# Patient Record
Sex: MALE | Employment: OTHER | URBAN - METROPOLITAN AREA
[De-identification: names, ages, dates, MRNs, and addresses within clinical notes are randomized per-mention and may not be internally consistent; named-entity substitution may affect disease eponyms.]

---

## 2020-04-06 ENCOUNTER — HOSPITAL ENCOUNTER (OUTPATIENT)
Age: 75
Setting detail: SPECIMEN
Discharge: HOME | End: 2020-04-06
Payer: MEDICARE

## 2020-04-06 LAB
ALBUMIN SERPL-MCNC: 3.6 G/DL (ref 3.5–5.2)
ALBUMIN/GLOBULIN RATIO: 1.1 (ref 1–2.5)
ALP BLD-CCNC: 149 U/L (ref 40–129)
ALT SERPL-CCNC: 40 U/L (ref 5–41)
AST SERPL-CCNC: 22 U/L
BILIRUB SERPL-MCNC: 0.51 MG/DL (ref 0.3–1.2)
BILIRUBIN DIRECT: 0.22 MG/DL
BILIRUBIN, INDIRECT: 0.29 MG/DL (ref 0–1)
CREAT SERPL-MCNC: 0.65 MG/DL (ref 0.7–1.2)
GFR AFRICAN AMERICAN: >60 ML/MIN
GFR NON-AFRICAN AMERICAN: >60 ML/MIN
GFR SERPL CREATININE-BSD FRML MDRD: ABNORMAL ML/MIN/{1.73_M2}
GFR SERPL CREATININE-BSD FRML MDRD: ABNORMAL ML/MIN/{1.73_M2}
GLOBULIN: ABNORMAL G/DL (ref 1.5–3.8)
PLATELET # BLD: 296 K/UL (ref 140–450)
TOTAL PROTEIN: 6.9 G/DL (ref 6.4–8.3)
WBC # BLD: 5.2 K/UL (ref 3.5–11)

## 2020-04-06 PROCEDURE — 82565 ASSAY OF CREATININE: CPT

## 2020-04-06 PROCEDURE — 85049 AUTOMATED PLATELET COUNT: CPT

## 2020-04-06 PROCEDURE — 80076 HEPATIC FUNCTION PANEL: CPT

## 2020-04-06 PROCEDURE — 85048 AUTOMATED LEUKOCYTE COUNT: CPT

## 2020-04-06 PROCEDURE — 36415 COLL VENOUS BLD VENIPUNCTURE: CPT

## 2020-04-09 ENCOUNTER — TELEPHONE (OUTPATIENT)
Dept: INFECTIOUS DISEASES | Age: 75
End: 2020-04-09

## 2020-04-20 ENCOUNTER — HOSPITAL ENCOUNTER (OUTPATIENT)
Age: 75
Setting detail: SPECIMEN
Discharge: HOME | End: 2020-04-20
Payer: MEDICARE

## 2020-04-20 LAB
ALBUMIN SERPL-MCNC: 3.9 G/DL (ref 3.5–5.2)
ALBUMIN/GLOBULIN RATIO: 1.3 (ref 1–2.5)
ALP BLD-CCNC: 125 U/L (ref 40–129)
ALT SERPL-CCNC: 36 U/L (ref 5–41)
AST SERPL-CCNC: 30 U/L
BILIRUB SERPL-MCNC: 0.6 MG/DL (ref 0.3–1.2)
BILIRUBIN DIRECT: 0.18 MG/DL
BILIRUBIN, INDIRECT: 0.42 MG/DL (ref 0–1)
CREAT SERPL-MCNC: 0.58 MG/DL (ref 0.7–1.2)
GFR AFRICAN AMERICAN: >60 ML/MIN
GFR NON-AFRICAN AMERICAN: >60 ML/MIN
GFR SERPL CREATININE-BSD FRML MDRD: ABNORMAL ML/MIN/{1.73_M2}
GFR SERPL CREATININE-BSD FRML MDRD: ABNORMAL ML/MIN/{1.73_M2}
GLOBULIN: NORMAL G/DL (ref 1.5–3.8)
PLATELET # BLD: 156 K/UL (ref 138–453)
TOTAL PROTEIN: 6.9 G/DL (ref 6.4–8.3)
WBC # BLD: 4.7 K/UL (ref 3.5–11.3)

## 2020-04-20 PROCEDURE — 82565 ASSAY OF CREATININE: CPT

## 2020-04-20 PROCEDURE — 80076 HEPATIC FUNCTION PANEL: CPT

## 2020-04-20 PROCEDURE — 85048 AUTOMATED LEUKOCYTE COUNT: CPT

## 2020-04-20 PROCEDURE — 85049 AUTOMATED PLATELET COUNT: CPT

## 2020-04-22 ENCOUNTER — TELEPHONE (OUTPATIENT)
Dept: INFECTIOUS DISEASES | Age: 75
End: 2020-04-22

## 2020-04-22 ENCOUNTER — TELEMEDICINE (OUTPATIENT)
Dept: INFECTIOUS DISEASES | Age: 75
End: 2020-04-22
Payer: MEDICARE

## 2020-04-22 VITALS
TEMPERATURE: 98 F | SYSTOLIC BLOOD PRESSURE: 118 MMHG | DIASTOLIC BLOOD PRESSURE: 76 MMHG | HEIGHT: 66 IN | WEIGHT: 157 LBS | BODY MASS INDEX: 25.23 KG/M2

## 2020-04-22 PROCEDURE — G8427 DOCREV CUR MEDS BY ELIG CLIN: HCPCS | Performed by: INTERNAL MEDICINE

## 2020-04-22 PROCEDURE — 4040F PNEUMOC VAC/ADMIN/RCVD: CPT | Performed by: INTERNAL MEDICINE

## 2020-04-22 PROCEDURE — 3017F COLORECTAL CA SCREEN DOC REV: CPT | Performed by: INTERNAL MEDICINE

## 2020-04-22 PROCEDURE — 1123F ACP DISCUSS/DSCN MKR DOCD: CPT | Performed by: INTERNAL MEDICINE

## 2020-04-22 PROCEDURE — 99213 OFFICE O/P EST LOW 20 MIN: CPT | Performed by: INTERNAL MEDICINE

## 2020-04-22 RX ORDER — SIMVASTATIN 5 MG
5 TABLET ORAL NIGHTLY
COMMUNITY

## 2020-04-22 NOTE — PROGRESS NOTES
note was completed using a voice transcription system. Every effort was made to ensure accuracy. However, inadvertent computerized transcription errors may be present.

## 2020-04-27 ENCOUNTER — HOSPITAL ENCOUNTER (OUTPATIENT)
Age: 75
Setting detail: SPECIMEN
Discharge: HOME | End: 2020-04-27
Payer: MEDICARE

## 2020-04-27 LAB
ALBUMIN SERPL-MCNC: 3.9 G/DL (ref 3.5–5.2)
ALBUMIN/GLOBULIN RATIO: 1.4 (ref 1–2.5)
ALP BLD-CCNC: 111 U/L (ref 40–129)
ALT SERPL-CCNC: 41 U/L (ref 5–41)
AST SERPL-CCNC: 31 U/L
BILIRUB SERPL-MCNC: 1 MG/DL (ref 0.3–1.2)
BILIRUBIN DIRECT: 0.23 MG/DL
BILIRUBIN, INDIRECT: 0.77 MG/DL (ref 0–1)
CREAT SERPL-MCNC: 0.58 MG/DL (ref 0.7–1.2)
GFR AFRICAN AMERICAN: >60 ML/MIN
GFR NON-AFRICAN AMERICAN: >60 ML/MIN
GFR SERPL CREATININE-BSD FRML MDRD: ABNORMAL ML/MIN/{1.73_M2}
GFR SERPL CREATININE-BSD FRML MDRD: ABNORMAL ML/MIN/{1.73_M2}
GLOBULIN: NORMAL G/DL (ref 1.5–3.8)
PLATELET # BLD: 151 K/UL (ref 138–453)
TOTAL PROTEIN: 6.7 G/DL (ref 6.4–8.3)
WBC # BLD: 4.8 K/UL (ref 3.5–11.3)

## 2020-04-27 PROCEDURE — 85049 AUTOMATED PLATELET COUNT: CPT

## 2020-04-27 PROCEDURE — 85048 AUTOMATED LEUKOCYTE COUNT: CPT

## 2020-04-27 PROCEDURE — 82565 ASSAY OF CREATININE: CPT

## 2020-04-27 PROCEDURE — 80076 HEPATIC FUNCTION PANEL: CPT

## 2020-05-04 ENCOUNTER — HOSPITAL ENCOUNTER (OUTPATIENT)
Age: 75
Setting detail: SPECIMEN
Discharge: HOME | End: 2020-05-04
Payer: MEDICARE

## 2020-05-04 ENCOUNTER — TELEPHONE (OUTPATIENT)
Dept: INFECTIOUS DISEASES | Age: 75
End: 2020-05-04

## 2020-05-04 LAB
ALBUMIN SERPL-MCNC: 3.9 G/DL (ref 3.5–5.2)
ALBUMIN/GLOBULIN RATIO: 1.7 (ref 1–2.5)
ALP BLD-CCNC: 102 U/L (ref 40–129)
ALT SERPL-CCNC: 40 U/L (ref 5–41)
AST SERPL-CCNC: 28 U/L
BILIRUB SERPL-MCNC: 0.61 MG/DL (ref 0.3–1.2)
BILIRUBIN DIRECT: 0.16 MG/DL
BILIRUBIN, INDIRECT: 0.45 MG/DL (ref 0–1)
CREAT SERPL-MCNC: 0.61 MG/DL (ref 0.7–1.2)
GFR AFRICAN AMERICAN: >60 ML/MIN
GFR NON-AFRICAN AMERICAN: >60 ML/MIN
GFR SERPL CREATININE-BSD FRML MDRD: ABNORMAL ML/MIN/{1.73_M2}
GFR SERPL CREATININE-BSD FRML MDRD: ABNORMAL ML/MIN/{1.73_M2}
GLOBULIN: ABNORMAL G/DL (ref 1.5–3.8)
PLATELET # BLD: 133 K/UL (ref 138–453)
TOTAL PROTEIN: 6.2 G/DL (ref 6.4–8.3)
WBC # BLD: 4.1 K/UL (ref 3.5–11.3)

## 2020-05-04 PROCEDURE — 82565 ASSAY OF CREATININE: CPT

## 2020-05-04 PROCEDURE — 85049 AUTOMATED PLATELET COUNT: CPT

## 2020-05-04 PROCEDURE — 85048 AUTOMATED LEUKOCYTE COUNT: CPT

## 2020-05-04 PROCEDURE — 80076 HEPATIC FUNCTION PANEL: CPT

## 2020-05-04 NOTE — TELEPHONE ENCOUNTER
Carolyn Bailon MD 5/4/2020 10:40 AM Damir Ortiz [id number] called asking about IV ABX end date. Signed order to [facility] shows start date of 3-31 and to continue for 6 weeks which would be May 11th. Your last note says May 7th. When do you want it to end? Thanks, Raji Lopez 5/4/2020 11:22 AM       Dr Paula Michele called and said to use the date in his last note. I called [facility] and spoke to Allison Pichardo, Pharmacist and gave verbal order. Patient also advised.  [phone]

## 2020-05-13 DIAGNOSIS — L02.91 ABSCESS: Primary | ICD-10-CM

## 2020-05-14 ENCOUNTER — HOSPITAL ENCOUNTER (OUTPATIENT)
Age: 75
Setting detail: SPECIMEN
Discharge: HOME | End: 2020-05-14
Payer: MEDICARE

## 2020-05-14 LAB — PLATELET # BLD: 133 K/UL (ref 138–453)

## 2020-05-14 PROCEDURE — 85049 AUTOMATED PLATELET COUNT: CPT

## 2020-05-27 DIAGNOSIS — L02.91 ABSCESS: Primary | ICD-10-CM

## 2020-06-10 ENCOUNTER — TELEPHONE (OUTPATIENT)
Dept: INFECTIOUS DISEASES | Age: 75
End: 2020-06-10

## 2020-06-12 NOTE — TELEPHONE ENCOUNTER
Patient notified of results. He states that PCP called him and notified him and that they are following up with platelets.

## 2020-06-12 NOTE — TELEPHONE ENCOUNTER
Patient platelets on lower side , contact PCP office to order new labs for follow-up and advise patient to follow with the PCP for the platelets count f/u

## 2020-07-01 ENCOUNTER — OFFICE VISIT (OUTPATIENT)
Dept: INFECTIOUS DISEASES | Age: 75
End: 2020-07-01
Payer: MEDICARE

## 2020-07-01 VITALS
HEART RATE: 65 BPM | TEMPERATURE: 98.1 F | WEIGHT: 165.6 LBS | OXYGEN SATURATION: 100 % | SYSTOLIC BLOOD PRESSURE: 153 MMHG | DIASTOLIC BLOOD PRESSURE: 91 MMHG | HEIGHT: 67 IN | BODY MASS INDEX: 25.99 KG/M2

## 2020-07-01 PROBLEM — N41.0 ACUTE PROSTATITIS: Status: ACTIVE | Noted: 2018-05-07

## 2020-07-01 PROBLEM — N40.1 BENIGN PROSTATIC HYPERPLASIA WITH URINARY OBSTRUCTION: Status: ACTIVE | Noted: 2018-05-07

## 2020-07-01 PROBLEM — N13.8 BENIGN PROSTATIC HYPERPLASIA WITH URINARY OBSTRUCTION: Status: ACTIVE | Noted: 2018-05-07

## 2020-07-01 PROCEDURE — G8417 CALC BMI ABV UP PARAM F/U: HCPCS | Performed by: INTERNAL MEDICINE

## 2020-07-01 PROCEDURE — 1123F ACP DISCUSS/DSCN MKR DOCD: CPT | Performed by: INTERNAL MEDICINE

## 2020-07-01 PROCEDURE — G8427 DOCREV CUR MEDS BY ELIG CLIN: HCPCS | Performed by: INTERNAL MEDICINE

## 2020-07-01 PROCEDURE — 1036F TOBACCO NON-USER: CPT | Performed by: INTERNAL MEDICINE

## 2020-07-01 PROCEDURE — 99213 OFFICE O/P EST LOW 20 MIN: CPT | Performed by: INTERNAL MEDICINE

## 2020-07-01 PROCEDURE — 3017F COLORECTAL CA SCREEN DOC REV: CPT | Performed by: INTERNAL MEDICINE

## 2020-07-01 PROCEDURE — 4040F PNEUMOC VAC/ADMIN/RCVD: CPT | Performed by: INTERNAL MEDICINE

## 2020-07-01 NOTE — PROGRESS NOTES
[facility]    Follow-up NOTE           Visit date: 7/1/2020      Reason for visit /chief complaints   Liver abscess    Assessment:      Diagnosis Orders   1. Abscess     1. 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 BPH - benign prostatic hyperplasia N40.0    5. UTI (urinary tract infection) N39.0  Recently treated              Plan:     Completed [facility]  Abdominal drain has been removed  Feeling better  Platelets improved according to the patient was in 150s range  Follow-up with me as needed  Advised to follow-up with primary about platelets    HPI:    Patient is 80-year-old male came in for follow-up. I am following him for liver abscess status post treatment with Invanz. No fever chills. No cough or shortness of breath. No vomiting or diarrhea. No past medical history on file. No past surgical history on file.   Social History     Socioeconomic History    Marital status:      Spouse name: None    Number of children: None    Years of education: None    Highest education level: None   Occupational History    None   Social Needs    Financial resource strain: None    Food insecurity     Worry: None     Inability: None    Transportation needs     Medical: None     Non-medical: None   Tobacco Use    Smoking status: Never Smoker    Smokeless tobacco: Never Used   Substance and Sexual Activity    Alcohol use: None    Drug use: None    Sexual activity: None   Lifestyle    Physical activity     Days per week: None     Minutes per session: None    Stress: None   Relationships    Social connections     Talks on phone: None     Gets together: None     Attends Christian service: None     Active member of club or organization: None     Attends meetings of clubs or organizations: None     Relationship status: None    Intimate partner violence     Fear of current or ex partner: None     Emotionally abused: None     Physically abused: None Forced sexual activity: None   Other Topics Concern    None   Social History Narrative    None     No family history on file. Current med     Current Outpatient Medications:     simvastatin (ZOCOR) 5 MG tablet, Take 5 mg by mouth nightly, Disp: , Rfl:      Review of Systems:  CONSTITUTIONAL:  negative  EYES:  negative  HEENT:  negative  RESPIRATORY:  negative  CARDIOVASCULAR:  negative  GASTROINTESTINAL:  negative  GENITOURINARY:  negative  INTEGUMENT/BREAST:  negative  HEMATOLOGIC/LYMPHATIC:  negative  ALLERGIC/IMMUNOLOGIC:  negative  ENDOCRINE:  negative  MUSCULOSKELETAL:  negative  NEUROLOGICAL:  negative  BEHAVIOR/PSYCH:  negative    BP (!) 153/91 (Site: Right Upper Arm, Position: Sitting, Cuff Size: Medium Adult)   Pulse 65   Temp 98.1 °F (36.7 °C) (Temporal)   Ht 5' 7\" (1.702 m)   Wt 165 lb 9.6 oz (75.1 kg)   SpO2 100%   BMI 25.94 kg/m²       EXAM:  CONSTITUTIONAL:  awake, alert, cooperative, no apparent distress,  EYES: conjunctiva normal  ENT:  Normocephalic, without obvious abnormality, atraumatic,  LUNGS:  No increased work of breathing, good air exchange, clear to auscultation bilaterally, no crackles or wheezing  CARDIOVASCULAR:  regular rate and rhythm, normal S1 and S2,  no murmur noted  ABDOMEN:   normal bowel sounds, soft, non-distended, non-tender, no masses palpated, no hepatosplenomegally,   MUSCULOSKELETAL:  There is no redness, warmth, or swelling of the joints. NEUROLOGIC:  Awake, alert, oriented to name, place and time  SKIN:  No rash      Data Review:    Reviewed    IMAGING:          Julianna Baron MD  7/1/2020  10:42 AM      This note was completed using a voice transcription system. Every effort was made to ensure accuracy. However, inadvertent computerized transcription errors may be present.

## 2024-08-16 LAB
CREAT SERPL-MCNC: 0.9 MG/DL
GFR AFRICAN AMERICAN: 98.7 ML/M1.7
GFR NON-AFRICAN AMERICAN: 81.6 ML/M1.7

## 2024-10-14 RX ORDER — MULTIVITAMIN WITH IRON
1 TABLET ORAL DAILY
COMMUNITY

## 2024-10-14 RX ORDER — METOPROLOL SUCCINATE 25 MG/1
25 TABLET, EXTENDED RELEASE ORAL DAILY
COMMUNITY
Start: 2024-04-03

## 2024-10-14 RX ORDER — CHOLECALCIFEROL (VITAMIN D3) 125 MCG
5000 CAPSULE ORAL DAILY
COMMUNITY

## 2024-10-14 RX ORDER — SIMVASTATIN 40 MG
40 TABLET ORAL NIGHTLY
COMMUNITY

## 2024-10-14 RX ORDER — ACETAMINOPHEN 500 MG
500 TABLET ORAL EVERY 6 HOURS PRN
COMMUNITY

## 2024-10-14 RX ORDER — MULTIVIT WITH MINERALS/LUTEIN
250 TABLET ORAL DAILY
COMMUNITY

## 2024-10-15 NOTE — DISCHARGE INSTRUCTIONS
Discharge instructions: Cystoscopy/Urethral dilation  You may experience pain and/or burning with urination and see blood in the urine after your procedure. This should resolve over the next few days.  Restart eliquis in 3 days (Saturday)  Ok to discharge home in good condition  No heavy lifting, >10 lbs for today  Patient should avoid strenuous activity for today  Patient should walk moderately at home   Ok to shower   Patient may resume diet as tolerated  Please call attending physician or hospital  with questions  Call or go to ED if fever (> 101F), intractable nausea vomiting or pain, inability to urinate  Please take prescriptions as directed if prescribed      Patient should follow up with Dr. Cabezas, in 8 weeks, call to confirm appointment    You may restart the Eliquis on Saturday October 19th . If you are still having blood in your urine, check with  before starting the Eliquis.

## 2024-10-16 ENCOUNTER — ANESTHESIA EVENT (OUTPATIENT)
Dept: OPERATING ROOM | Age: 79
End: 2024-10-16
Payer: MEDICARE

## 2024-10-16 ENCOUNTER — HOSPITAL ENCOUNTER (OUTPATIENT)
Age: 79
Setting detail: OUTPATIENT SURGERY
Discharge: HOME | End: 2024-10-16
Attending: UROLOGY | Admitting: UROLOGY
Payer: MEDICARE

## 2024-10-16 ENCOUNTER — APPOINTMENT (OUTPATIENT)
Dept: GENERAL RADIOLOGY | Age: 79
End: 2024-10-16
Attending: UROLOGY
Payer: MEDICARE

## 2024-10-16 ENCOUNTER — ANESTHESIA (OUTPATIENT)
Dept: OPERATING ROOM | Age: 79
End: 2024-10-16
Payer: MEDICARE

## 2024-10-16 VITALS
RESPIRATION RATE: 16 BRPM | TEMPERATURE: 98 F | WEIGHT: 159 LBS | BODY MASS INDEX: 25.55 KG/M2 | SYSTOLIC BLOOD PRESSURE: 157 MMHG | DIASTOLIC BLOOD PRESSURE: 91 MMHG | OXYGEN SATURATION: 100 % | HEART RATE: 52 BPM | HEIGHT: 66 IN

## 2024-10-16 LAB
BUN BLD-MCNC: 10 MG/DL (ref 8–26)
CA-I BLD-SCNC: 1.29 MMOL/L (ref 1.15–1.33)
CHLORIDE BLD-SCNC: 105 MMOL/L (ref 98–107)
CO2 BLD CALC-SCNC: 31 MMOL/L (ref 22–30)
EGFR, POC: >90 ML/MIN/1.73M2
GLUCOSE BLD-MCNC: 95 MG/DL (ref 74–100)
HCT VFR BLD AUTO: 43 % (ref 41–53)
POC ANION GAP: 11 MMOL/L (ref 7–16)
POC CREATININE: 0.7 MG/DL (ref 0.51–1.19)
POC HEMOGLOBIN (CALC): 14.6 G/DL (ref 13.5–17.5)
POC LACTIC ACID: 1.6 MMOL/L (ref 0.56–1.39)
POTASSIUM BLD-SCNC: 4.2 MMOL/L (ref 3.5–4.5)
SODIUM BLD-SCNC: 146 MMOL/L (ref 138–146)

## 2024-10-16 PROCEDURE — 80051 ELECTROLYTE PANEL: CPT

## 2024-10-16 PROCEDURE — 2580000003 HC RX 258: Performed by: UROLOGY

## 2024-10-16 PROCEDURE — 7100000011 HC PHASE II RECOVERY - ADDTL 15 MIN: Performed by: UROLOGY

## 2024-10-16 PROCEDURE — 82947 ASSAY GLUCOSE BLOOD QUANT: CPT

## 2024-10-16 PROCEDURE — 85014 HEMATOCRIT: CPT

## 2024-10-16 PROCEDURE — 6360000004 HC RX CONTRAST MEDICATION: Performed by: UROLOGY

## 2024-10-16 PROCEDURE — 6360000002 HC RX W HCPCS: Performed by: NURSE ANESTHETIST, CERTIFIED REGISTERED

## 2024-10-16 PROCEDURE — 83605 ASSAY OF LACTIC ACID: CPT

## 2024-10-16 PROCEDURE — 2580000003 HC RX 258: Performed by: NURSE ANESTHETIST, CERTIFIED REGISTERED

## 2024-10-16 PROCEDURE — 82330 ASSAY OF CALCIUM: CPT

## 2024-10-16 PROCEDURE — 3600000012 HC SURGERY LEVEL 2 ADDTL 15MIN: Performed by: UROLOGY

## 2024-10-16 PROCEDURE — 6360000002 HC RX W HCPCS: Performed by: PHYSICIAN ASSISTANT

## 2024-10-16 PROCEDURE — 3700000000 HC ANESTHESIA ATTENDED CARE: Performed by: UROLOGY

## 2024-10-16 PROCEDURE — 2720000010 HC SURG SUPPLY STERILE: Performed by: UROLOGY

## 2024-10-16 PROCEDURE — 84520 ASSAY OF UREA NITROGEN: CPT

## 2024-10-16 PROCEDURE — 2709999900 HC NON-CHARGEABLE SUPPLY: Performed by: UROLOGY

## 2024-10-16 PROCEDURE — 93005 ELECTROCARDIOGRAM TRACING: CPT | Performed by: ANESTHESIOLOGY

## 2024-10-16 PROCEDURE — 3700000001 HC ADD 15 MINUTES (ANESTHESIA): Performed by: UROLOGY

## 2024-10-16 PROCEDURE — 3600000002 HC SURGERY LEVEL 2 BASE: Performed by: UROLOGY

## 2024-10-16 PROCEDURE — 7100000010 HC PHASE II RECOVERY - FIRST 15 MIN: Performed by: UROLOGY

## 2024-10-16 PROCEDURE — 82565 ASSAY OF CREATININE: CPT

## 2024-10-16 RX ORDER — FENTANYL CITRATE 50 UG/ML
25 INJECTION, SOLUTION INTRAMUSCULAR; INTRAVENOUS EVERY 5 MIN PRN
Status: DISCONTINUED | OUTPATIENT
Start: 2024-10-16 | End: 2024-10-16 | Stop reason: HOSPADM

## 2024-10-16 RX ORDER — NALOXONE HYDROCHLORIDE 0.4 MG/ML
INJECTION, SOLUTION INTRAMUSCULAR; INTRAVENOUS; SUBCUTANEOUS PRN
Status: DISCONTINUED | OUTPATIENT
Start: 2024-10-16 | End: 2024-10-16 | Stop reason: HOSPADM

## 2024-10-16 RX ORDER — PROCHLORPERAZINE EDISYLATE 5 MG/ML
5 INJECTION INTRAMUSCULAR; INTRAVENOUS
Status: DISCONTINUED | OUTPATIENT
Start: 2024-10-16 | End: 2024-10-16 | Stop reason: HOSPADM

## 2024-10-16 RX ORDER — ONDANSETRON 2 MG/ML
4 INJECTION INTRAMUSCULAR; INTRAVENOUS
Status: DISCONTINUED | OUTPATIENT
Start: 2024-10-16 | End: 2024-10-16 | Stop reason: HOSPADM

## 2024-10-16 RX ORDER — SODIUM CHLORIDE, SODIUM LACTATE, POTASSIUM CHLORIDE, CALCIUM CHLORIDE 600; 310; 30; 20 MG/100ML; MG/100ML; MG/100ML; MG/100ML
INJECTION, SOLUTION INTRAVENOUS
Status: DISCONTINUED | OUTPATIENT
Start: 2024-10-16 | End: 2024-10-16 | Stop reason: SDUPTHER

## 2024-10-16 RX ORDER — PROPOFOL 10 MG/ML
INJECTION, EMULSION INTRAVENOUS
Status: DISCONTINUED | OUTPATIENT
Start: 2024-10-16 | End: 2024-10-16 | Stop reason: SDUPTHER

## 2024-10-16 RX ORDER — FENTANYL CITRATE 50 UG/ML
INJECTION, SOLUTION INTRAMUSCULAR; INTRAVENOUS
Status: DISCONTINUED | OUTPATIENT
Start: 2024-10-16 | End: 2024-10-16 | Stop reason: SDUPTHER

## 2024-10-16 RX ORDER — SODIUM CHLORIDE, SODIUM LACTATE, POTASSIUM CHLORIDE, CALCIUM CHLORIDE 600; 310; 30; 20 MG/100ML; MG/100ML; MG/100ML; MG/100ML
INJECTION, SOLUTION INTRAVENOUS CONTINUOUS
Status: DISCONTINUED | OUTPATIENT
Start: 2024-10-16 | End: 2024-10-16 | Stop reason: HOSPADM

## 2024-10-16 RX ORDER — MAGNESIUM HYDROXIDE 1200 MG/15ML
LIQUID ORAL CONTINUOUS PRN
Status: DISCONTINUED | OUTPATIENT
Start: 2024-10-16 | End: 2024-10-16 | Stop reason: HOSPADM

## 2024-10-16 RX ORDER — MIDAZOLAM HYDROCHLORIDE 1 MG/ML
INJECTION INTRAMUSCULAR; INTRAVENOUS
Status: DISCONTINUED | OUTPATIENT
Start: 2024-10-16 | End: 2024-10-16 | Stop reason: SDUPTHER

## 2024-10-16 RX ORDER — KETOROLAC TROMETHAMINE 30 MG/ML
INJECTION, SOLUTION INTRAMUSCULAR; INTRAVENOUS
Status: DISCONTINUED | OUTPATIENT
Start: 2024-10-16 | End: 2024-10-16 | Stop reason: SDUPTHER

## 2024-10-16 RX ORDER — SULFAMETHOXAZOLE AND TRIMETHOPRIM 800; 160 MG/1; MG/1
1 TABLET ORAL 2 TIMES DAILY
Qty: 6 TABLET | Refills: 0 | Status: SHIPPED | OUTPATIENT
Start: 2024-10-16 | End: 2024-10-19

## 2024-10-16 RX ORDER — SODIUM CHLORIDE 0.9 % (FLUSH) 0.9 %
5-40 SYRINGE (ML) INJECTION EVERY 12 HOURS SCHEDULED
Status: DISCONTINUED | OUTPATIENT
Start: 2024-10-16 | End: 2024-10-16 | Stop reason: HOSPADM

## 2024-10-16 RX ADMIN — KETOROLAC TROMETHAMINE 15 MG: 30 INJECTION, SOLUTION INTRAMUSCULAR at 13:06

## 2024-10-16 RX ADMIN — SODIUM CHLORIDE, POTASSIUM CHLORIDE, SODIUM LACTATE AND CALCIUM CHLORIDE: 600; 310; 30; 20 INJECTION, SOLUTION INTRAVENOUS at 12:30

## 2024-10-16 RX ADMIN — Medication 2000 MG: at 12:44

## 2024-10-16 RX ADMIN — FENTANYL CITRATE 50 MCG: 50 INJECTION, SOLUTION INTRAMUSCULAR; INTRAVENOUS at 12:45

## 2024-10-16 RX ADMIN — PROPOFOL 50 MG: 10 INJECTION, EMULSION INTRAVENOUS at 12:32

## 2024-10-16 RX ADMIN — FENTANYL CITRATE 50 MCG: 50 INJECTION, SOLUTION INTRAMUSCULAR; INTRAVENOUS at 12:35

## 2024-10-16 RX ADMIN — MIDAZOLAM 2 MG: 1 INJECTION INTRAMUSCULAR; INTRAVENOUS at 12:30

## 2024-10-16 ASSESSMENT — PAIN SCALES - GENERAL
PAINLEVEL_OUTOF10: 1
PAINLEVEL_OUTOF10: 0
PAINLEVEL_OUTOF10: 1

## 2024-10-16 ASSESSMENT — PAIN - FUNCTIONAL ASSESSMENT
PAIN_FUNCTIONAL_ASSESSMENT: NONE - DENIES PAIN
PAIN_FUNCTIONAL_ASSESSMENT: 0-10

## 2024-10-16 ASSESSMENT — PAIN DESCRIPTION - DESCRIPTORS: DESCRIPTORS: PRESSURE

## 2024-10-16 NOTE — OP NOTE
Operative Note      Patient: Sam Phillips  YOB: 1945  MRN: [id number]    Date of Procedure: 10/16/2024    Pre-Op Diagnosis Codes:      * Gross hematuria [R31.0]     * Other stricture of urethra in male [N35.819]    Post-Op Diagnosis: Same       Procedure(s):  CYSTOSCOPY RETROGRADE URETHROGRAM, URETHRAL DILATION  (C-ARM)  Retrograde urethrogram  Cystoscopy ureteroscopy  Urethral dilation with S-curve dilators (12 Czech-20 Czech)    Surgeon(s):  Basilio Cabezas MD    Assistant:   Resident: Cathy Martinez MD    Anesthesia: Monitor Anesthesia Care    Estimated Blood Loss (mL): Minimal    Complications: None    Specimens:   * No specimens in log *    Implants:  * No implants in log *      Drains: * No LDAs found *    Findings:  Infection Present At Time Of Surgery (PATOS) (choose all levels that have infection present):  No infection present  Other Findings: Wide caliber bulbar urethral stricture, normal cystoscopy    Indications for procedure:  Patient is a 78-year-old male with a 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  He has required DVIU in the past (2018).  He has also had a recent history of gross hematuria.  He presents today for retrograde urethrogram, cystoscopy, and urethral dilation.  Risk, benefits, and alternatives were discussed with the patient.  Informed consent was obtained.    Narrative of procedure:  Patient was met in the preoperative area and informed consent was obtained.  He was brought to the operating room suite and underwent initiation of monitored anesthesia care.  He was then repositioned into dorsolithotomy and prepped and draped in standard sterile fashion.  Surgical timeout was performed with 2 patient identifiers.  He received Ancef 2 g for surgical prophylaxis.    We began by inserting a 14 Czech red rubber catheter into the distal urethra.  Retrograde urethrogram was performed with AP and oblique views.  We observed what appeared to be

## 2024-10-16 NOTE — ANESTHESIA POSTPROCEDURE EVALUATION
Department of Anesthesiology  Postprocedure Note    Patient: Sam Phillips  MRN: [id number]  YOB: 1945  Date of evaluation: 10/16/2024    Procedure Summary       Date: 10/16/24 Room / Location: [address]    Anesthesia Start: 1230 Anesthesia Stop: 1311    Procedure: CYSTOSCOPY RETROGRADE URETHROGRAM, URETHRAL DILATION  (C-ARM) Diagnosis:       Gross hematuria      Other stricture of urethra in male      (Gross hematuria [R31.0])      (Other stricture of urethra in male [N35.819])    Surgeons: Basilio Cabezas MD Responsible Provider: Cricket Soler MD    Anesthesia Type: TIVA ASA Status: 2            Anesthesia Type: No value filed.    Alysia Phase I: Alysia Score: 10    Alysia Phase II: Alysia Score: 10    Anesthesia Post Evaluation    Patient location during evaluation: PACU  Patient participation: complete - patient participated  Level of consciousness: awake and alert  Airway patency: patent  Nausea & Vomiting: no nausea and no vomiting  Cardiovascular status: blood pressure returned to baseline  Respiratory status: acceptable  Hydration status: euvolemic  Comments: No known anesthesia related complication  Multimodal analgesia pain management approach  Pain management: adequate    No notable events documented.

## 2024-10-16 NOTE — PROGRESS NOTES
Patient needs to urinate before discharge. Only 25ml urinated at this time. Bladder scan done with 225ml noted. Continuing oral intake and IV fluids.

## 2024-10-16 NOTE — ANESTHESIA PRE PROCEDURE
Department of Anesthesiology  Preprocedure Note       Name:  Sam Phillips   Age:  78 y.o.  :  1945                                          MRN:  [id number]         Date:  10/16/2024      Surgeon: Surgeon(s):  Basilio Cabezas MD    Procedure: Procedure(s):  CYSTOSCOPY RETROGRADE URETHROGRAM, URETHRAL DILATION  (C-ARM)    Medications prior to admission:   Prior to Admission medications    Medication Sig Start Date End Date Taking? Authorizing Provider   metoprolol succinate (TOPROL XL) 25 MG extended release tablet Take 1 tablet by mouth daily 4/3/24  Yes Andre Cool MD   Vitamin D 125 MCG (5000 UT) CAPS capsule Take 1 capsule by mouth daily   Yes Andre Cool MD   Ascorbic Acid (VITAMIN C) 250 MG tablet Take 1 tablet by mouth daily   Yes Andre Cool MD   Multiple Vitamin (MULTIVITAMIN) TABS tablet Take 1 tablet by mouth daily   Yes Andre Cool MD   acetaminophen (TYLENOL) 500 MG tablet Take 1 tablet by mouth every 6 hours as needed for Pain   Yes Andre Cool MD   simvastatin (ZOCOR) 40 MG tablet Take 1 tablet by mouth nightly    Andre Cool MD   apixaban (ELIQUIS) 5 MG TABS tablet Take 1 tablet by mouth 2 times daily 23   ProviderAndre MD       Current medications:    No current facility-administered medications for this encounter.     Current Outpatient Medications   Medication Sig Dispense Refill   • metoprolol succinate (TOPROL XL) 25 MG extended release tablet Take 1 tablet by mouth daily     • Vitamin D 125 MCG (5000 UT) CAPS capsule Take 1 capsule by mouth daily     • Ascorbic Acid (VITAMIN C) 250 MG tablet Take 1 tablet by mouth daily     • Multiple Vitamin (MULTIVITAMIN) TABS tablet Take 1 tablet by mouth daily     • acetaminophen (TYLENOL) 500 MG tablet Take 1 tablet by mouth every 6 hours as needed for Pain     • simvastatin (ZOCOR) 40 MG tablet Take 1 tablet by mouth nightly     • apixaban (ELIQUIS) 5 MG TABS tablet Take 1

## 2024-10-18 LAB
EKG ATRIAL RATE: 64 BPM
EKG P AXIS: 37 DEGREES
EKG P-R INTERVAL: 172 MS
EKG Q-T INTERVAL: 470 MS
EKG QRS DURATION: 146 MS
EKG QTC CALCULATION (BAZETT): 484 MS
EKG R AXIS: -28 DEGREES
EKG T AXIS: 65 DEGREES
EKG VENTRICULAR RATE: 64 BPM

## 2024-10-18 PROCEDURE — 93010 ELECTROCARDIOGRAM REPORT: CPT | Performed by: INTERNAL MEDICINE

## 2024-10-25 LAB — URINE CULTURE, ROUTINE: NORMAL STATUS

## 2024-12-15 LAB — URINE CULTURE, ROUTINE: NORMAL STATUS

## (undated) DEVICE — S-CURVE URETHRAL DILATOR SET WITH AQ, HYDROPHILIC COATING: Brand: S~CURVE

## (undated) DEVICE — GLOVE ORANGE PI 7 1/2   MSG9075

## (undated) DEVICE — CATHETER,URETHRAL,REDRUBBER,STRL,16FR: Brand: MEDLINE

## (undated) DEVICE — Device

## (undated) DEVICE — STRAP ARMBRD W1.5XL32IN FOAM STR YET SFT W/ HK AND LOOP